# Patient Record
(demographics unavailable — no encounter records)

---

## 2018-11-13 NOTE — PDOC H&P
History of Present Illness


Patient complains of: Abdominal, muscle and joint pain


History of Present Illness: 


MEMO RICE is a 52 year old female with a past medical history of 

fibromyalgia and tobacco dependence.  She presents with 48 hours of arthralgia 

and nonproductive cough developing fever prompting evaluation emergency room 

where she is found to have a nonproductive cough, fever and bandemia and 

elevated liver function.  Patient's  was diagnosed with acute hepatitis 

A 1 week ago, she admits rhinorrhea, sore throat, nausea without vomiting.  She 

is taken over-the-counter ibuprofen without significant improvement, denies 

recent antibiotic use.  She started on empiric antibiotics and referred to the 

hospitalist for admission.





Past Medical History


Musculoskeltal Medical History: Reports: Fibromyalgia


Psychiatric Medical History: Reports: Tobacco Dependency





Past Surgical History


Past Surgical History: Reports: None





Social History


Information Source: Patient


Lives with: Family


Smoking Status: Current Every Day Smoker


Cigarettes Packs Per Day: 1


Drugs: None





- Advance Directive


Resuscitation Status: Full Code





Family History


Parental Family History Reviewed: Yes


Children Family History Reviewed: Yes


Sibling(s) Family History Reviewed.: Yes





Medication/Allergy


Allergies/Adverse Reactions: 


 





latex Allergy (Verified 11/13/18 00:41)


 


bees Allergy (Uncoded 11/13/18 00:41)


 











Review of Systems


Constitutional: ABSENT: chills, fever(s), headache(s), weight gain, weight loss


Eyes: ABSENT: visual disturbances


Ears: ABSENT: hearing changes


Cardiovascular: ABSENT: chest pain, dyspnea on exertion, edema, orthropnea, 

palpitations


Respiratory: ABSENT: cough, hemoptysis


Gastrointestinal: ABSENT: abdominal pain, constipation, diarrhea, hematemesis, 

hematochezia, nausea, vomiting


Genitourinary: ABSENT: dysuria, hematuria


Musculoskeletal: ABSENT: joint swelling


Integumentary: ABSENT: rash, wounds


Neurological: ABSENT: abnormal gait, abnormal speech, confusion, dizziness, 

focal weakness, syncope


Psychiatric: ABSENT: anxiety, depression, homidical ideation, suicidal ideation


Endocrine: ABSENT: cold intolerance, heat intolerance, polydipsia, polyuria


Hematologic/Lymphatic: ABSENT: easy bleeding, easy bruising





Physical Exam


Vital Signs: 


 











Temp Pulse Resp BP Pulse Ox


 


 98.3 F   88   16   91/46 L  95 


 


 11/13/18 04:34  11/13/18 04:34  11/13/18 04:34  11/13/18 04:34  11/13/18 04:34








 Intake & Output











 11/11/18 11/12/18 11/13/18





 11:59 11:59 11:59


 


Intake Total   2000


 


Balance   2000


 


Weight   71.4 kg











General appearance: PRESENT: cooperative, mild distress.  ABSENT: hard of 

hearing


Head exam: PRESENT: atraumatic, normocephalic


Eye exam: PRESENT: conjunctiva pink, EOMI, PERRLA.  ABSENT: scleral icterus


Ear exam: PRESENT: normal external ear exam


Mouth exam: PRESENT: moist, tongue midline


Neck exam: ABSENT: carotid bruit, JVD, lymphadenopathy, thyromegaly


Respiratory exam: PRESENT: accessory muscle use, crackles, decreased breath 

sounds, rales, retraction, rhonchi, tachypnea


Cardiovascular exam: PRESENT: RRR.  ABSENT: diastolic murmur, rubs, systolic 

murmur


Pulses: PRESENT: normal dorsalis pedis pul


Vascular exam: PRESENT: normal capillary refill


GI/Abdominal exam: PRESENT: normal bowel sounds, soft.  ABSENT: distended, 

guarding, mass, organolmegaly, rebound, tenderness


Rectal exam: PRESENT: deferred


Extremities exam: PRESENT: full ROM.  ABSENT: calf tenderness, clubbing, pedal 

edema


Neurological exam: PRESENT: alert, awake, oriented to person, oriented to place

, oriented to time, oriented to situation, CN II-XII grossly intact.  ABSENT: 

motor sensory deficit


Psychiatric exam: PRESENT: appropriate affect, normal mood.  ABSENT: homicidal 

ideation, suicidal ideation


Skin exam: PRESENT: dry, intact, warm.  ABSENT: cyanosis, rash





Results


Laboratory Results: 


 





 11/13/18 03:10 





 11/13/18 03:10 





 











  11/13/18 11/13/18 11/13/18





  03:10 03:10 03:10


 


WBC  4.3  


 


RBC  5.75 H  


 


Hgb  18.0 H  


 


Hct  51.9 H  


 


MCV  90  


 


MCH  31.3  


 


MCHC  34.6  


 


RDW  13.5  


 


Plt Count  130 L  


 


Seg Neutrophils %  Not Reportable  


 


Lymphocytes %  Not Reportable  


 


Monocytes %  Not Reportable  


 


Eosinophils %  Not Reportable  


 


Basophils %  Not Reportable  


 


Absolute Neutrophils  Not Reportable  


 


Absolute Lymphocytes  Not Reportable  


 


Absolute Monocytes  Not Reportable  


 


Absolute Eosinophils  Not Reportable  


 


Absolute Basophils  Not Reportable  


 


Sodium   139.5 


 


Potassium   4.0 


 


Chloride   104 


 


Carbon Dioxide   25 


 


Anion Gap   11 


 


BUN   14 


 


Creatinine   0.83 


 


Est GFR ( Amer)   > 60 


 


Est GFR (Non-Af Amer)   > 60 


 


Glucose   105 


 


Calcium   8.5 


 


Total Bilirubin   0.5 


 


AST   324 H 


 


ALT   289 H 


 


Alkaline Phosphatase   152 H 


 


Total Protein   6.7 


 


Albumin   3.7 


 


Lipase   161.0 


 


Urine Color    YELLOW


 


Urine Appearance    SLIGHTLY-CLOUDY


 


Urine pH    5.0


 


Ur Specific Gravity    1.026


 


Urine Protein    NEGATIVE


 


Urine Glucose (UA)    NEGATIVE


 


Urine Ketones    NEGATIVE


 


Urine Blood    SMALL H


 


Urine Nitrite    POSITIVE H


 


Ur Leukocyte Esterase    NEGATIVE


 


Urine WBC (Auto)    1


 


Urine RBC (Auto)    1











Impressions: 


 





Chest X-Ray  11/13/18 02:38


IMPRESSION:


 


No acute cardiopulmonary process


 


 


 2011 [employer]- All Rights Reserved


 














Assessment & Plan





- Diagnosis


(1) Pneumonia


Is this a current diagnosis for this admission?: Yes   


Plan: 


Suspect influenza, initiate empiric antibiotics, Tamiflu and pneumonia care set

, follow-up CBC, influenza screen and culture








(2) Hepatitis A


Is this a current diagnosis for this admission?: Yes   


Plan: 


Hepatitis profile, supportive care








(3) Abdominal pain


Qualifiers: 


   Abdominal location: generalized   Qualified Code(s): R10.84 - Generalized 

abdominal pain   


Is this a current diagnosis for this admission?: Yes   


Plan: 


Secondary to #2, supportive care symptomatic management








(4) Bandemia


Is this a current diagnosis for this admission?: Yes   


Plan: 


Likely secondary to #1, follow-up CBC








- Time


Time Spent: 50 to 70 Minutes





- Inpatient Certification


Medical Necessity: Need Close Monitoring Due to Risk of Patient Decompensation

## 2018-11-13 NOTE — RADIOLOGY REPORT (SQ)
EXAM DESCRIPTION: 



US ABDOMEN LIMITED



COMPLETED DATE/TME:  11/13/2018 05:15



CLINICAL HISTORY: abd pain, vomiting, bandemia, elevated liver

enzym



COMPARISON: None.



TECHNIQUE: Real-time sonographic images of the right upper

abdomen were obtained using a curved multihertz transducer.



FINDINGS:



Pancreas: The pancreas is not well-visualized due to overlying

bowel gas.



Vascular: The visualized portions of the aorta and IVC are

unremarkable.



Liver: The liver has normal contour and echogenicity. Hepatopedal

flow in the portal vein.  Findings confirmed with color and

spectral Doppler imaging. The common bile duct measures 0.3 cm.

Small simple right hepatic cyst.



Gallbladder: Normal gallbladder wall thickness. Echogenic

material that is not shadowing in the dependent portion of the

gallbladder lumen. No pericholecystic fluid collection.



Right Kidney: The right kidney measures 10.8 cm in length. No

hydronephrosis, solid renal mass, or shadowing calculi.







IMPRESSION:



1. Small amount of layering sludge in the gallbladder lumen. No

definite shadowing gallstones identified. The gallbladder

otherwise has a normal appearance.

## 2018-11-13 NOTE — RADIOLOGY REPORT (SQ)
EXAM DESCRIPTION: 



XR CHEST 2 VIEWS



COMPLETED DATE/TME:  11/13/2018 02:38



CLINICAL HISTORY: 



52 years, Female, cough, chills



COMPARISON:

None.



NUMBER OF VIEWS:

2



TECHNIQUE:

Frontal and lateral views of the chest



LIMITATIONS:

None.



FINDINGS:



Heart size is normal. Scarring in the lung bases. Lungs are

otherwise clear. No pneumothorax



IMPRESSION:



No acute cardiopulmonary process

 



 2011 [facility] [employer]- All Rights Reserved

## 2018-11-15 NOTE — DISCHARGE SUMMARY E
Discharge Summary



NAME: MEMO RICE

MRN:  [id number]        : 1966     AGE: 52Y

ADMITTED: 2018                  DISCHARGED: 2018



CODE STATUS:

FULL CODE.



PRIMARY CARE PHYSICIAN:

The patient will be referred to the [facility].



DISCHARGE DIAGNOSES:

Includes:

1.  Community-acquired pneumonia.

2.  Acute hepatitis A.

3.  Tobacco dependency, continuous.

4.  Fibromyalgia.



DISCHARGE MEDICATIONS:

Include:

1.  Flonase 2 sprays nasally q. 12 hours.

2.  Doxycycline 100 mg p.o. b.i.d., 14 capsules, 0 refills.

3.  Flexeril 5 mg p.o. q. 8 hours p.r.n.

4.  Elavil 25 mg p.o. q. hour of sleep.

5.  Recommend nicotine patch, 14 g a day patch for over-the-counter use.



DIET:

Heart healthy.



ACTIVITY:

As tolerated.



CONDITION:

Good.



DIAGNOSTICS:

Lab values are as follows:  Hematology obtained on 2018:  WBCs are

5.8, hemoglobin is 13.0, hematocrit 44.6, platelet count is 136,000.



Chemistry obtained on 2018:  Sodium is 141, potassium 3.9, chloride

is 108, carbon dioxide 24, BUN 9, creatinine is 0.68.  Glucose 91, calcium

7.6, bilirubin 0.4.  , ALT is 308, Alk phos 147, total protein 5.7,

albumin 2.9, lipase is 161.



Urinalysis obtained on 2018:  Color yellow, appearance slightly

cloudy.  PH is 5.0, specific gravity is 1.026.  Protein negative, glucose

negative, ketones negative, occult blood small, nitrite positive,

bilirubin negative, urobilinogen is negative, leukocyte esterase is

negative.  WBCs 1, RBCs 1, casts 16, bacteria 3+.



Serologies obtained on 2018:  Hepatitis A IgM antibody is positive.



Microbiology:  Urine culture appears to be contaminant.



Blood cultures obtained on 2018 reveal no growth.



Abdominal ultrasound obtained on 2018 reveals a small amount of

layering sludge in the gallbladder lumen, no definite stones identified.



PHYSICAL EXAMINATION:

GENERAL:  On examination, the patient is a well-developed, well-nourished

52-year-old  female who is awake, alert, and oriented to person,

place, time, and situation.  She is verbal, conversational, does not

appear to be in acute distress.



VITAL SIGNS:  Temperature is 98.7, pulse 81, respirations 15, blood

pressure is 115/71, oxygen saturation is 98% on room air.



SKIN:  Warm and dry.  No rash.  She is not diaphoretic.



HEENT:  Pupils are reactive.  Mucous membranes are moist.



CARDIOVASCULAR SYSTEM:  Heart is in normal sinus rhythm on the monitor.



CHEST:  The patient does have some expiratory wheezes noted of the lung

fields, but overall symmetrical, unlabored.  No evidence of conversational

dyspnea.



ABDOMEN:  Soft, nontender.



EXTREMITIES:  No clubbing, cyanosis, edema.



PSYCHIATRIC:  Appropriate affect, pleasant mood.



HISTORY OF PRESENT ILLNESS:

The patient is a 52-year-old  female with a past medical history

of heavy tobacco use as well as fibromyalgia.  The patient presented to

the emergency department with a chief complaint of muscle and joint pain. 

The patient stated she had 48 hours of arthralgia as well as a strong

nonproductive cough and developed a fever.  While in the emergency

department, the patient was found to have evidence of bandemia, but also

elevated LFTs.  The patient's  did have acute hepatitis A about a

week ago; however, the patient had different symptoms, including

rhinorrhea, sore throat, nausea without vomiting.  The patient has been

taking over-the-counter ibuprofen without improvement of symptoms and she

was referred to the hospitalist for admission and management.



HOSPITAL COURSE:

The patient was admitted to continuous telemetry unit.  The patient was

covered with Levaquin and given breathing treatments.  Additionally, the

patient was no longer requiring O2.  The patient's symptoms continued to

improve.  The patient was seen on rounds this morning and overall admits

to feeling tremendously better.  The patient received 3 minutes of smoking

cessation education and declines any pharmacological intervention, but

later decided she wanted a p.r.n. nicotine patch.  The patient's LFTs are

consistent with an acute hepatitis A like her , but this will be

managed symptomatically.



DISCHARGE PLANNING:

The patient will follow up with the [facility] within 1 week

for hospital followup.



Time spent on this discharge including assessment, plan, physical

examination, patient education, review of records, and family meeting is

25 minutes.





DICTATING PHYSICIAN:  BRI UNGER NP





[id number]                  DT: 11/15/2018    [id number]

PHY#: [id number]            DD: 2018    [id number]

ID:   [id number]           JOB#: [id number]       ACCT: [id number]



cc:PAUL WEILAND, M.D. MICHAEL KENNEDY, NP

>
